# Patient Record
Sex: FEMALE | Race: OTHER | HISPANIC OR LATINO | URBAN - METROPOLITAN AREA
[De-identification: names, ages, dates, MRNs, and addresses within clinical notes are randomized per-mention and may not be internally consistent; named-entity substitution may affect disease eponyms.]

---

## 2022-03-26 ENCOUNTER — EMERGENCY (EMERGENCY)
Facility: HOSPITAL | Age: 22
LOS: 1 days | Discharge: HOME | End: 2022-03-26
Attending: EMERGENCY MEDICINE | Admitting: EMERGENCY MEDICINE
Payer: MEDICAID

## 2022-03-26 VITALS
SYSTOLIC BLOOD PRESSURE: 107 MMHG | TEMPERATURE: 98 F | HEART RATE: 86 BPM | DIASTOLIC BLOOD PRESSURE: 89 MMHG | OXYGEN SATURATION: 100 % | RESPIRATION RATE: 15 BRPM

## 2022-03-26 PROCEDURE — 99284 EMERGENCY DEPT VISIT MOD MDM: CPT

## 2022-03-26 RX ORDER — DIPHENHYDRAMINE HCL 50 MG
50 CAPSULE ORAL ONCE
Refills: 0 | Status: COMPLETED | OUTPATIENT
Start: 2022-03-26 | End: 2022-03-26

## 2022-03-26 RX ORDER — EPINEPHRINE 0.3 MG/.3ML
0.3 INJECTION INTRAMUSCULAR; SUBCUTANEOUS
Qty: 1 | Refills: 0
Start: 2022-03-26

## 2022-03-26 RX ORDER — DIPHENHYDRAMINE HCL 50 MG
3 CAPSULE ORAL
Qty: 84 | Refills: 0
Start: 2022-03-26 | End: 2022-04-01

## 2022-03-26 RX ADMIN — Medication 50 MILLIGRAM(S): at 20:33

## 2022-03-26 RX ADMIN — Medication 50 MILLIGRAM(S): at 21:14

## 2022-03-26 NOTE — ED PROVIDER NOTE - CLINICAL SUMMARY MEDICAL DECISION MAKING FREE TEXT BOX
23yo F with no PMHX p/w 2 days of pruritic body rash that has been spreading along with feelings of chest tightness and mild sob.  Of note, patient was at [facility] ER yesterday for same symptoms but prior to that had vomiting and diarrhea and was given an injection in shoulder ?epinephrine and discharged on PO benadryl, pepcid, and prednisone. EXAM with diffuse uriticaria without airway involvement. A/P- allergic reaction that does not meet criteria for anaphylaxis. will trial more po benadryl and pred then re-eval will need to see derm/allergist outpt

## 2022-03-26 NOTE — ED ADULT NURSE NOTE - OBJECTIVE STATEMENT
Patient a/ox4, ambulatory, Tajik speaking, c/o allergic reaction> Patient states she developed a body rash and joint pain x 3 days ago from unknown allergen. Endorses mild SOB and chest pain with inspiration. States she was seen at another hospital yesterday and received injections. Respirations even and unlabored. Rash visible to patient chest and upper extremities. Medicated as ordered.

## 2022-03-26 NOTE — ED PROVIDER NOTE - PROGRESS NOTE DETAILS
Dr. Yvon Moe DO (ED ATTENDING):  after benadryl 50mg pt reports same symptoms. will order another po 50mg Dr. Yvon Moe DO (ED ATTENDING):  after 50mg more of benadryl (100mg total) patient with improving pruritis and urticarial rash diminishing on exam. pt feels comfortable going home with plan to continued home rx she has for prednisone and pepcid and increasing dosage of benadryl form 25mg to 75mg Q6hrs

## 2022-03-26 NOTE — ED ADULT TRIAGE NOTE - CHIEF COMPLAINT QUOTE
Pt arrives ambulatory c/o possible allergic reaction x3 days, unknown allergen. Pt c/o widespread sores and cramps in hands. Pt also endorses CP and intermittent SOB. Denies PMH. Pt went to another hospital yesterday, received "an injection, but I don't think it helped."

## 2022-03-26 NOTE — ED PROVIDER NOTE - OBJECTIVE STATEMENT
23yo F with no PMHX p/w 2 days of pruritic body rash that has been spreading along with feelings of chest tightness and mild sob.  Of note, patient was at [facility] ER yesterday for same symptoms but prior to that had vomiting and diarrhea and was given an injection in shoulder ?epinephrine and discharged on PO benadryl, pepcid, and prednisone.  Pt has been taking all meds with no improvement in rash but has not had any more n/v/d. Never had any  tongue or lips swelling. Was given f/u with a dermatologist and allergist but has not yet made an appmt

## 2022-03-26 NOTE — ED PROVIDER NOTE - PHYSICAL EXAMINATION
General: Patient alert in no apparent distress  Skin: Dry and intact. +diffuse urticarial rash on neck, chest, back, and all extremities with scattered excoriations.   HEENT: Head atraumatic. Oral mucosa moist. No pharyngeal exudates, redness, or tonsillar enlargement, uvula midline without signs of peritonsillar abscess. NO swelling of posterior pharynx or lips or tongue  Eyes: Conjunctiva normal  Cardiac: Regular rhythm and rate. No pretibial edema b/l  Respiratory: Lungs clear b/l and symmetric. No respiratory distress. Able to speak in complete sentences. NO STRIDOR  Gastrointestinal: Abdomen soft, nondistended, nontender  Musculoskeletal: Moves all extremities spontaneously  Neurological: alert and oriented to person, place, and time  Psychiatric: Calm and cooperative

## 2022-03-26 NOTE — ED PROVIDER NOTE - PATIENT PORTAL LINK FT
You can access the FollowMyHealth Patient Portal offered by [facility] by registering at the following website: [email or web address]. By joining [facility]’s FollowMyHealth portal, you will also be able to view your health information using other applications (apps) compatible with our system.

## 2022-03-26 NOTE — ED PROVIDER NOTE - NSFOLLOWUPCLINICS_GEN_ALL_ED_FT
[facility] [place] [facility]  Allergy  [address] [place] [address]  Phone: [phone]  Fax:   Follow Up Time: 4-6 Days

## 2022-03-26 NOTE — ED PROVIDER NOTE - NS ED ROS FT
Constitutional: No weakness or fatigue, no fevers or chills  Skin: +rash +pruritis  HEENT: No sore throat  Cardiovascular: No chest pain, no shortness of breath  Respiratory: No shortness of breath, no cough  Gastrointestinal: No abdominal pain, no nausea, no vomiting, no diarrhea, no constipation  Musculoskeletal: No joint pain  Genitourinary: No dysuria or hematuria  Neurological: No focal weakness or tingling

## 2022-03-26 NOTE — ED PROVIDER NOTE - NSFOLLOWUPINSTRUCTIONS_ED_ALL_ED_FT
You were seen in the Emergency Room for allergic reaction and evaluated for any life-threatening conditions.     After our evaluation, we have determined you do not have a life-threatening condition today and you can be discharged home.    Please return to the Emergency Room if your symptoms change or worsen.    Please follow up with a dermatologist and allergist You were seen in the Emergency Room for allergic reaction and evaluated for any life-threatening conditions.     After our evaluation, we have determined you do not have a life-threatening type of allergic reaction today and you can be discharged home.    Please return to the Emergency Room if your symptoms change or worsen.    Please continue your:  -prednisone (steroid)  -pepcid (anti histamine)  -benadryl (antihistamine). increase the dosage from 25 to 75mg every 6 hours.    Please follow up with a dermatologist and allergist